# Patient Record
Sex: FEMALE | Race: WHITE | NOT HISPANIC OR LATINO | Employment: FULL TIME | URBAN - METROPOLITAN AREA
[De-identification: names, ages, dates, MRNs, and addresses within clinical notes are randomized per-mention and may not be internally consistent; named-entity substitution may affect disease eponyms.]

---

## 2017-09-07 ENCOUNTER — OFFICE VISIT (OUTPATIENT)
Dept: FAMILY MEDICINE CLINIC | Facility: CLINIC | Age: 37
End: 2017-09-07

## 2017-09-07 VITALS
BODY MASS INDEX: 24.66 KG/M2 | SYSTOLIC BLOOD PRESSURE: 100 MMHG | WEIGHT: 148 LBS | OXYGEN SATURATION: 99 % | DIASTOLIC BLOOD PRESSURE: 70 MMHG | TEMPERATURE: 98.7 F | HEIGHT: 65 IN | HEART RATE: 65 BPM

## 2017-09-07 DIAGNOSIS — J02.9 SORE THROAT: Primary | ICD-10-CM

## 2017-09-07 DIAGNOSIS — J02.9 ACUTE PHARYNGITIS, UNSPECIFIED ETIOLOGY: ICD-10-CM

## 2017-09-07 DIAGNOSIS — J01.00 ACUTE NON-RECURRENT MAXILLARY SINUSITIS: ICD-10-CM

## 2017-09-07 DIAGNOSIS — J40 BRONCHITIS: ICD-10-CM

## 2017-09-07 LAB
EXPIRATION DATE: NORMAL
INTERNAL CONTROL: NORMAL
Lab: NORMAL
S PYO AG THROAT QL: NEGATIVE

## 2017-09-07 PROCEDURE — 99213 OFFICE O/P EST LOW 20 MIN: CPT | Performed by: INTERNAL MEDICINE

## 2017-09-07 PROCEDURE — 87880 STREP A ASSAY W/OPTIC: CPT | Performed by: INTERNAL MEDICINE

## 2017-09-07 RX ORDER — AZITHROMYCIN 250 MG/1
TABLET, FILM COATED ORAL
Qty: 6 TABLET | Refills: 0 | Status: SHIPPED | OUTPATIENT
Start: 2017-09-07 | End: 2017-11-15

## 2017-09-07 NOTE — PROGRESS NOTES
Subjective   Nickie Ferrell is a 37 y.o. female. Patient is here today for problems with a sinusitis with yellow drainage that's been going on for 3 or 4 days, severe sore throat as well as some cough that's slightly productive.  Patient's had no definite fever and is had no pleurisy or chills or respiratory distress.  The throat does hurt to swallow.  Her sinus drainage has been yellow and profuse and she is not improving and feels ill.    Chief Complaint   Patient presents with   • Cough     SINUS DRAINAGE, SORE THROAT, LYMPH NODES SWOLLEN- STARTED YELLOW DRAINAGE ABOUT 2 DAYS AGO          Vitals:    09/07/17 1251   BP: 100/70   Pulse: 65   Temp: 98.7 °F (37.1 °C)   SpO2: 99%     The following portions of the patient's history were reviewed and updated as appropriate: allergies, current medications, past family history, past medical history, past social history, past surgical history and problem list.    Past Medical History:   Diagnosis Date   • Chronic cough    • Chronic low back pain    • UTI (urinary tract infection)       No Known Allergies   Social History     Social History   • Marital status:      Spouse name: N/A   • Number of children: N/A   • Years of education: N/A     Occupational History   • Not on file.     Social History Main Topics   • Smoking status: Former Smoker   • Smokeless tobacco: Not on file   • Alcohol use Yes   • Drug use: Defer   • Sexual activity: Defer     Other Topics Concern   • Not on file     Social History Narrative   • No narrative on file        Current Outpatient Prescriptions:   •  azithromycin (ZITHROMAX Z-GALO) 250 MG tablet, Take 2 tablets the first day, then 1 tablet daily for 4 days., Disp: 6 tablet, Rfl: 0     Objective     History of Present Illness     Review of Systems   Constitutional: Negative for chills and fever.   HENT: Positive for congestion, postnasal drip, rhinorrhea, sinus pressure and sore throat.    Eyes: Negative.    Respiratory: Positive for  cough. Negative for shortness of breath and wheezing.    Cardiovascular: Negative.    Gastrointestinal: Negative.    Genitourinary: Negative.    Musculoskeletal: Negative.    Neurological: Negative.    Psychiatric/Behavioral: Negative.        Physical Exam   Constitutional: She is oriented to person, place, and time. She appears well-developed and well-nourished.   Pleasant, cooperative and in no distress    HENT:   Head: Normocephalic and atraumatic.   Nose: Right sinus exhibits maxillary sinus tenderness and frontal sinus tenderness. Left sinus exhibits maxillary sinus tenderness and frontal sinus tenderness.   Mouth/Throat: Uvula is midline, oropharynx is clear and moist and mucous membranes are normal. No tonsillar exudate.   Eyes: Conjunctivae are normal. Pupils are equal, round, and reactive to light.   Neck: Normal range of motion. No thyromegaly present.   Cardiovascular: Normal rate, regular rhythm and normal heart sounds.    Pulmonary/Chest: Effort normal and breath sounds normal. No respiratory distress. She has no wheezes. She has no rales.   Musculoskeletal: Normal range of motion. She exhibits no edema.   Lymphadenopathy:     She has no cervical adenopathy.   Neurological: She is alert and oriented to person, place, and time.   Skin: Skin is warm and dry.   Psychiatric: She has a normal mood and affect. Her behavior is normal.   Nursing note and vitals reviewed.      ASSESSMENT  rapid strep test was negative.  The patient has some sinusitis, pharyngitis and bronchitis that it's not improving.  Clinically she does not have a pneumonia.     Problem List Items Addressed This Visit     None      Visit Diagnoses     Sore throat    -  Primary    Relevant Orders    POC Rapid Strep A (Completed)    Acute pharyngitis, unspecified etiology        Bronchitis        Acute non-recurrent maxillary sinusitis              PLAN  I'm going to start patient on a Z-[facility]dullahi    There are no Patient Instructions on file for this  visit.  No Follow-up on file.

## 2017-11-15 ENCOUNTER — OFFICE VISIT (OUTPATIENT)
Dept: FAMILY MEDICINE CLINIC | Facility: CLINIC | Age: 37
End: 2017-11-15

## 2017-11-15 VITALS
SYSTOLIC BLOOD PRESSURE: 102 MMHG | DIASTOLIC BLOOD PRESSURE: 64 MMHG | TEMPERATURE: 98.9 F | OXYGEN SATURATION: 100 % | HEIGHT: 65 IN | BODY MASS INDEX: 24.76 KG/M2 | WEIGHT: 148.6 LBS | HEART RATE: 70 BPM

## 2017-11-15 DIAGNOSIS — J06.9 ACUTE URI: Primary | ICD-10-CM

## 2017-11-15 PROCEDURE — 99213 OFFICE O/P EST LOW 20 MIN: CPT | Performed by: NURSE PRACTITIONER

## 2017-11-15 RX ORDER — AZITHROMYCIN 250 MG/1
TABLET, FILM COATED ORAL
Qty: 6 TABLET | Refills: 0 | Status: SHIPPED | OUTPATIENT
Start: 2017-11-15 | End: 2018-03-14

## 2017-11-15 RX ORDER — BENZONATATE 100 MG/1
100 CAPSULE ORAL 3 TIMES DAILY PRN
Qty: 30 CAPSULE | Refills: 0 | Status: SHIPPED | OUTPATIENT
Start: 2017-11-15 | End: 2018-03-14

## 2017-11-15 NOTE — PROGRESS NOTES
Subjective   Nickie Ferrell is a 37 y.o. female.   Chief Complaint   Patient presents with   • Cough     going on since sunday    • Ear Fullness     Vitals:    11/15/17 1138   BP: 102/64   Pulse: 70   Temp: 98.9 °F (37.2 °C)   SpO2: 100%     Patient's last menstrual period was 10/23/2017.    History of Present Illness  Nickie is here for an acute visit. She c/o ear pain, nasal congestion and cough for 3 days. She denies fever, chills or body aches. She has not taken any medication otc. She did get her flu vaccine.     The following portions of the patient's history were reviewed and updated as appropriate: allergies, current medications, past family history, past medical history, past social history, past surgical history and problem list.    Review of Systems   Constitutional: Negative for chills, fatigue and fever.   HENT: Positive for congestion, ear pain, postnasal drip and sore throat.    Respiratory: Positive for cough. Negative for shortness of breath and wheezing.    Cardiovascular: Negative.        Objective   Physical Exam   Constitutional: Vital signs are normal. She appears well-developed and well-nourished. No distress.   HENT:   Right Ear: Ear canal normal. Tympanic membrane is not erythematous. A middle ear effusion is present.   Left Ear: Ear canal normal. Tympanic membrane is not erythematous. A middle ear effusion is present.   Nose: Mucosal edema and rhinorrhea present.   Mouth/Throat: Uvula is midline and oropharynx is clear and moist.   Cardiovascular: Normal rate, regular rhythm and normal heart sounds.    Pulmonary/Chest: Effort normal and breath sounds normal.   Neurological: She is alert.   Skin: Skin is warm, dry and intact.       Assessment/Plan   Nickie was seen today for cough and ear fullness.    Diagnoses and all orders for this visit:    Acute URI    Other orders  -     azithromycin (ZITHROMAX Z-GALO) 250 MG tablet; Take 2 tablets the first day, then 1 tablet daily for 4 days.  -      benzonatate (TESSALON PERLES) 100 MG capsule; Take 1 capsule by mouth 3 (Three) Times a Day As Needed for Cough.        Symptom treatment for 7-10 days  Rest and fluids  Tylenol or motrin   Avoid second hand smoke and allergens   Since she is going out of town I gave her a zpak to hold on to and take if needed  Throat lozenges, humidifier, vicks vapor rub as needed  Follow up if your symptoms persist past 7-10 days or sooner if your symptoms worsen or if you develop new symptoms

## 2018-03-14 ENCOUNTER — OFFICE VISIT (OUTPATIENT)
Dept: FAMILY MEDICINE CLINIC | Facility: CLINIC | Age: 38
End: 2018-03-14

## 2018-03-14 VITALS
HEIGHT: 65 IN | SYSTOLIC BLOOD PRESSURE: 90 MMHG | RESPIRATION RATE: 17 BRPM | OXYGEN SATURATION: 98 % | WEIGHT: 147 LBS | BODY MASS INDEX: 24.49 KG/M2 | TEMPERATURE: 98.9 F | DIASTOLIC BLOOD PRESSURE: 60 MMHG | HEART RATE: 99 BPM

## 2018-03-14 DIAGNOSIS — J02.9 SORE THROAT: Primary | ICD-10-CM

## 2018-03-14 DIAGNOSIS — J02.9 ACUTE PHARYNGITIS, UNSPECIFIED ETIOLOGY: ICD-10-CM

## 2018-03-14 DIAGNOSIS — J06.9 ACUTE URI: ICD-10-CM

## 2018-03-14 DIAGNOSIS — Z20.818 EXPOSURE TO STREP THROAT: ICD-10-CM

## 2018-03-14 DIAGNOSIS — R50.9 FEVER, UNSPECIFIED FEVER CAUSE: ICD-10-CM

## 2018-03-14 LAB
EXPIRATION DATE: NORMAL
EXPIRATION DATE: NORMAL
FLUAV AG NPH QL: NEGATIVE
FLUBV AG NPH QL: NEGATIVE
INTERNAL CONTROL: NORMAL
INTERNAL CONTROL: NORMAL
Lab: NORMAL
Lab: NORMAL
S PYO AG THROAT QL: NEGATIVE

## 2018-03-14 PROCEDURE — 87880 STREP A ASSAY W/OPTIC: CPT | Performed by: NURSE PRACTITIONER

## 2018-03-14 PROCEDURE — 87804 INFLUENZA ASSAY W/OPTIC: CPT | Performed by: NURSE PRACTITIONER

## 2018-03-14 PROCEDURE — 99213 OFFICE O/P EST LOW 20 MIN: CPT | Performed by: NURSE PRACTITIONER

## 2018-03-14 RX ORDER — ACETAMINOPHEN 500 MG
500 TABLET ORAL EVERY 6 HOURS PRN
COMMUNITY

## 2018-03-14 RX ORDER — AZITHROMYCIN 250 MG/1
TABLET, FILM COATED ORAL
Qty: 6 TABLET | Refills: 0 | Status: SHIPPED | OUTPATIENT
Start: 2018-03-14

## 2018-03-14 NOTE — PROGRESS NOTES
Subjective   Nickie Ferrell is a 37 y.o. female.   Chief Complaint   Patient presents with   • Sore Throat     for 1 day exposed to strep   • Headache   • Fever     Vitals:    03/14/18 0939   BP: 90/60   Pulse: 99   Resp: 17   Temp: 98.9 °F (37.2 °C)   SpO2: 98%     No LMP recorded.    History of Present Illness  Nickie is here for an acute visit. She c/o sore throat, nasal congestion, low grade fever of 100, headache that started this am. Her 4 year old daughter has had strep. She did get her flu vaccine     The following portions of the patient's history were reviewed and updated as appropriate: allergies, current medications, past family history, past medical history, past social history, past surgical history and problem list.    Review of Systems   Constitutional: Positive for fatigue and fever.   HENT: Positive for congestion, postnasal drip and sore throat.    Respiratory: Negative.    Cardiovascular: Negative.    Gastrointestinal: Negative for diarrhea, nausea and vomiting.   Neurological: Positive for headaches. Negative for dizziness.       Objective   Physical Exam   Constitutional: Vital signs are normal. She appears well-developed and well-nourished. She does not appear ill. No distress.   HENT:   Right Ear: Ear canal normal. A middle ear effusion is present.   Left Ear: Ear canal normal. A middle ear effusion is present.   Nose: Mucosal edema (nasal mucosa inflammed ) and rhinorrhea present.   Mouth/Throat: Uvula is midline. Posterior oropharyngeal erythema present.   Cardiovascular: Normal rate and regular rhythm.    Pulmonary/Chest: Effort normal and breath sounds normal.   Neurological: She is alert.   Skin: Skin is warm, dry and intact.   Psychiatric: She has a normal mood and affect.       Assessment/Plan   Nickie was seen today for sore throat, headache and fever.    Diagnoses and all orders for this visit:    Sore throat  -     POC Rapid Strep A    Exposure to strep throat    Fever,  unspecified fever cause  -     POC Influenza A / B      Strep neg   Flu neg  Treat symptoms for now  Rest and fluids  Tylenol or motrin  I did give her a zpak to hold on to and take as needed  Follow up if your symptoms persist, worsen or if new symptoms develop